# Patient Record
Sex: FEMALE | Race: WHITE | NOT HISPANIC OR LATINO | Employment: STUDENT | URBAN - METROPOLITAN AREA
[De-identification: names, ages, dates, MRNs, and addresses within clinical notes are randomized per-mention and may not be internally consistent; named-entity substitution may affect disease eponyms.]

---
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
## 2025-01-07 ENCOUNTER — OFFICE VISIT (OUTPATIENT)
Dept: DERMATOLOGY | Facility: CLINIC | Age: 20
End: 2025-01-07
Payer: COMMERCIAL

## 2025-01-07 DIAGNOSIS — L20.9 ATOPIC DERMATITIS, UNSPECIFIED TYPE: Primary | ICD-10-CM

## 2025-01-07 DIAGNOSIS — L85.8 KERATOSIS PILARIS: ICD-10-CM

## 2025-01-07 PROCEDURE — 1160F RVW MEDS BY RX/DR IN RCRD: CPT | Mod: CPTII,S$GLB,, | Performed by: STUDENT IN AN ORGANIZED HEALTH CARE EDUCATION/TRAINING PROGRAM

## 2025-01-07 PROCEDURE — 1159F MED LIST DOCD IN RCRD: CPT | Mod: CPTII,S$GLB,, | Performed by: STUDENT IN AN ORGANIZED HEALTH CARE EDUCATION/TRAINING PROGRAM

## 2025-01-07 PROCEDURE — 99204 OFFICE O/P NEW MOD 45 MIN: CPT | Mod: S$GLB,,, | Performed by: STUDENT IN AN ORGANIZED HEALTH CARE EDUCATION/TRAINING PROGRAM

## 2025-01-07 PROCEDURE — 99999 PR PBB SHADOW E&M-EST. PATIENT-LVL III: CPT | Mod: PBBFAC,,, | Performed by: STUDENT IN AN ORGANIZED HEALTH CARE EDUCATION/TRAINING PROGRAM

## 2025-01-07 RX ORDER — FLUOCINONIDE 0.5 MG/G
CREAM TOPICAL 2 TIMES DAILY
Qty: 60 G | Refills: 2 | Status: SHIPPED | OUTPATIENT
Start: 2025-01-07

## 2025-01-07 NOTE — PROGRESS NOTES
Subjective:      Patient ID:  Lina Xiong is a 19 y.o. female who presents for   Chief Complaint   Patient presents with    Rash     Pt is here for rash on body   Pt states the rash has been there since 2023   Pt states it comes with itching and dry patches   Pt states mainly on arms and legs       Used TAC which did not help    Review of Systems   Skin:  Positive for itching, rash and dry skin.       Objective:   Physical Exam   Constitutional: She appears well-developed and well-nourished. No distress.   Neurological: She is alert and oriented to person, place, and time. She is not disoriented.   Psychiatric: She has a normal mood and affect.   Skin:   Areas Examined (abnormalities noted in diagram):   RUE Inspected  LUE Inspection Performed  RLE Inspected  LLE Inspection Performed            Diagram Legend     Erythematous scaling macule/papule c/w actinic keratosis       Vascular papule c/w angioma      Pigmented verrucoid papule/plaque c/w seborrheic keratosis      Yellow umbilicated papule c/w sebaceous hyperplasia      Irregularly shaped tan macule c/w lentigo     1-2 mm smooth white papules consistent with Milia      Movable subcutaneous cyst with punctum c/w epidermal inclusion cyst      Subcutaneous movable cyst c/w pilar cyst      Firm pink to brown papule c/w dermatofibroma      Pedunculated fleshy papule(s) c/w skin tag(s)      Evenly pigmented macule c/w junctional nevus     Mildly variegated pigmented, slightly irregular-bordered macule c/w mildly atypical nevus      Flesh colored to evenly pigmented papule c/w intradermal nevus       Pink pearly papule/plaque c/w basal cell carcinoma      Erythematous hyperkeratotic cursted plaque c/w SCC      Surgical scar with no sign of skin cancer recurrence      Open and closed comedones      Inflammatory papules and pustules      Verrucoid papule consistent consistent with wart     Erythematous eczematous patches and plaques     Dystrophic  onycholytic nail with subungual debris c/w onychomycosis     Umbilicated papule    Erythematous-base heme-crusted tan verrucoid plaque consistent with inflamed seborrheic keratosis     Erythematous Silvery Scaling Plaque c/w Psoriasis     See annotation      Assessment / Plan:        Atopic dermatitis, unspecified type  -     fluocinonide 0.05% (LIDEX) 0.05 % cream; Apply topically 2 (two) times daily. Use to affected areas for up to 2 weeks then take a 1 week break or decrease to 3 times weekly. Do not apply to groin or face. Steroid. Use to red itchy patches when flaring  Dispense: 60 g; Refill: 2  - counseled on dry skin care. Cerave cream bid    Keratosis pilaris  - reassured  - amlactin bid           Follow up if symptoms worsen or fail to improve.

## 2025-01-07 NOTE — PATIENT INSTRUCTIONS
XEROSIS (DRY SKIN)        Definition    Xerosis is the term for dry skin.  We all have a natural oil coating over our skin produced by the skin oil glands.  If this oil is removed, the skin becomes dry which can lead to cracking, which can lead to inflammation.  Xerosis is usually a long-term problem that recurs often, especially in the winter.    Cause    Long hot baths or showers can remove our natural oil and lead to xerosis.  One should never take more than one bath or shower a day and for no longer than ten minutes.  Use of harsh soaps such as Zest, Dial, and Ivory can worsen and cause xerosis.  Cold winter weather worsens xerosis because the amount of moisture contained in cold air is much less than the amount of moisture in warm air.    Treatment    Treatment is intended to restore the natural oil to your skin.  Keep the skin lubricated.    Do not take more than one bath or shower a day.  Use lukewarm water, not hot.  Hot water dries out the skin.    Use a gentle moisturizing soap such as Cetaphil soap, Oil of Olay, Dove, Basis, Ivory moisture care, Restoraderm cleanser.    When toweling dry, dont rub.  Blot the skin so there is still some water left on the skin.  You should apply a moisturizing cream to all of the skin such as Cerave cream, Cetaphil cream, Lipikar [place] AP+ Intense Repair Moisturizing Cream or Restoraderm or Eucerin Original Formula cream.   Alpha hydroxyacid lotions, i.e., AmLactin, also work very well for preventing dry skin, but may burn when used on inflamed or reddened skin.    If you like to swim during the winter months, you should not use soap when getting out of the pool.  When you have finished swimming, rinse off the chlorine with cool to warm water.  If this will be the only shower of the day, then you may use Cetaphil or another mild soap to cleanse your skin.  After the shower, apply a moisturizing cream to all of the skin as above.        [address] [place] [address]/ [phone] FAX/ [email or web address]

## 2025-01-13 ENCOUNTER — HOSPITAL ENCOUNTER (OUTPATIENT)
Dept: RADIOLOGY | Facility: HOSPITAL | Age: 20
Discharge: HOME | End: 2025-01-13
Attending: INTERNAL MEDICINE
Payer: COMMERCIAL

## 2025-01-13 DIAGNOSIS — G43.E09 CHRONIC MIGRAINE WITH AURA WITHOUT STATUS MIGRAINOSUS, NOT INTRACTABLE: ICD-10-CM

## 2025-01-13 PROCEDURE — 70450 CT HEAD/BRAIN W/O DYE: CPT | Mod: TC

## 2025-01-13 PROCEDURE — 70450 CT HEAD/BRAIN W/O DYE: CPT | Mod: 26,,, | Performed by: RADIOLOGY

## 2025-01-14 ENCOUNTER — OFFICE VISIT (OUTPATIENT)
Dept: NEUROLOGY | Facility: CLINIC | Age: 20
End: 2025-01-14
Payer: COMMERCIAL

## 2025-01-14 VITALS
HEIGHT: 66 IN | HEART RATE: 102 BPM | BODY MASS INDEX: 42.14 KG/M2 | WEIGHT: 262.25 LBS | DIASTOLIC BLOOD PRESSURE: 123 MMHG | SYSTOLIC BLOOD PRESSURE: 160 MMHG

## 2025-01-14 DIAGNOSIS — G43.E09 CHRONIC MIGRAINE WITH AURA WITHOUT STATUS MIGRAINOSUS, NOT INTRACTABLE: ICD-10-CM

## 2025-01-14 DIAGNOSIS — R51.9 NONINTRACTABLE HEADACHE, UNSPECIFIED CHRONICITY PATTERN, UNSPECIFIED HEADACHE TYPE: Primary | ICD-10-CM

## 2025-01-14 PROCEDURE — 99203 OFFICE O/P NEW LOW 30 MIN: CPT | Mod: S$GLB,,, | Performed by: STUDENT IN AN ORGANIZED HEALTH CARE EDUCATION/TRAINING PROGRAM

## 2025-01-14 PROCEDURE — 3008F BODY MASS INDEX DOCD: CPT | Mod: CPTII,S$GLB,, | Performed by: STUDENT IN AN ORGANIZED HEALTH CARE EDUCATION/TRAINING PROGRAM

## 2025-01-14 PROCEDURE — 1159F MED LIST DOCD IN RCRD: CPT | Mod: CPTII,S$GLB,, | Performed by: STUDENT IN AN ORGANIZED HEALTH CARE EDUCATION/TRAINING PROGRAM

## 2025-01-14 PROCEDURE — 3080F DIAST BP >= 90 MM HG: CPT | Mod: CPTII,S$GLB,, | Performed by: STUDENT IN AN ORGANIZED HEALTH CARE EDUCATION/TRAINING PROGRAM

## 2025-01-14 PROCEDURE — 1160F RVW MEDS BY RX/DR IN RCRD: CPT | Mod: CPTII,S$GLB,, | Performed by: STUDENT IN AN ORGANIZED HEALTH CARE EDUCATION/TRAINING PROGRAM

## 2025-01-14 PROCEDURE — 99999 PR PBB SHADOW E&M-EST. PATIENT-LVL V: CPT | Mod: PBBFAC,,, | Performed by: STUDENT IN AN ORGANIZED HEALTH CARE EDUCATION/TRAINING PROGRAM

## 2025-01-14 PROCEDURE — 3077F SYST BP >= 140 MM HG: CPT | Mod: CPTII,S$GLB,, | Performed by: STUDENT IN AN ORGANIZED HEALTH CARE EDUCATION/TRAINING PROGRAM

## 2025-01-14 NOTE — PROGRESS NOTES
GENERAL NEUROLOGY VISIT   Date: 1/14/25  Patient Name: Lina Xiong   MRN: [id number]   PCP: Eliana Price  Referring Provider: Eliana Price MD    History:    Patient is a 19 y.o.  female who was referred for headache evaluation.  PMHx of ADHD, Hypertriglyceridemia, elevated BMI, POS, Hyperinsulinemia, irregular period.     Patient with hx of tension headache when grew up, reported mild and does not need medication for it, in the past few months reported new headache as detailed below.    Headache details:  #Description: R temporal, throbbing pain, usually when wake up in the morning,make her dizzy last portion of the day, other type tension headache,mild.   # of Total headache days per month:  3/ week.   # of Migraine days per month: 4/ months  Intensity of average and most severe headaches: 8/10 , mild 2-3/10  Duration of headache pain: whole to portion of the day  Laterality: Right   Location: R temporal   Aura: no   Photophobia and phonophobia: avoid light   Nausea and vomiting: no   Causes avoidance of physical activity:   Worsened by physical activity: no  Preventive Medications failed: none   Acute medications failed: none   OTC Medications: aleve , she take it 1-2 /week   Is medication overuse contributing to the patient's current migraine burden: no but patient is at risk     Sleep : she takes melatonin, reported snore,on frequent waking up whoever move a lot in bed, denies feeling tired in the morning, no day naps.     Denies blurred vision, headache severity changes with position.     Medical history: as above  Nephrolithiasis/Glaucoma - no  Stroke/heart disease - no  Hypertension - no hx but today 160/123  Anxiety/Depression - no  Latest Imaging: CTH , no acute findings     Current Regimen:   Aleve, stated take it 3 to 4 times a month  Past Medical History:   Diagnosis Date    ADHD (attention deficit hyperactivity disorder)        Past Surgical History:   Procedure Laterality Date     TYMPANOSTOMY TUBE PLACEMENT         Social History     Socioeconomic History    Marital status: Single   Tobacco Use    Smoking status: Never    Smokeless tobacco: Never   Substance and Sexual Activity    Alcohol use: Never    Drug use: Never    Sexual activity: Never   Social History Narrative    ** Merged History Encounter **         Adopted  Lives at home with both parents  Attends [employer] 12th grade  1 dog  On the track team and dance.     Social Drivers of Health     Financial Resource Strain: Low Risk  (1/16/2024)    Overall Financial Resource Strain (CARDIA)     Difficulty of Paying Living Expenses: Not hard at all   Food Insecurity: No Food Insecurity (1/16/2024)    Hunger Vital Sign     Worried About Running Out of Food in the Last Year: Never true     Ran Out of Food in the Last Year: Never true   Transportation Needs: No Transportation Needs (1/16/2024)    PRAPARE - Transportation     Lack of Transportation (Medical): No     Lack of Transportation (Non-Medical): No   Physical Activity: Insufficiently Active (1/16/2024)    Exercise Vital Sign     Days of Exercise per Week: 1 day     Minutes of Exercise per Session: 20 min   Stress: Stress Concern Present (1/16/2024)    [employer] - Occupational Stress Questionnaire     Feeling of Stress : Very much   Housing Stability: Unknown (1/16/2024)    Housing Stability Vital Sign     Unable to Pay for Housing in the Last Year: No     Unstable Housing in the Last Year: No       Review of patient's allergies indicates:  No Known Allergies    Current Outpatient Medications on File Prior to Visit   Medication Sig Dispense Refill    azelastine (ASTELIN) 137 mcg (0.1 %) nasal spray 1 spray (137 mcg total) by Nasal route 2 (two) times daily. 30 mL 11    dexmethylphenidate (FOCALIN XR) 30 mg 24 hr capsule Take 1 capsule by mouth every morning. 90 capsule 0    drospirenone-ethinyl estradioL (MIRTHA) 3-0.02 mg per tablet Take 1 tablet by  "mouth once daily. 84 tablet 3    fluocinonide 0.05% (LIDEX) 0.05 % cream Apply topically 2 (two) times daily. Use to affected areas for up to 2 weeks then take a 1 week break or decrease to 3 times weekly. Do not apply to groin or face. Steroid. Use to red itchy patches when flaring 60 g 2    levocetirizine (XYZAL) 5 MG tablet Take 5 mg by mouth.      metFORMIN (GLUCOPHAGE) 500 MG tablet Take 1 tablet (500 mg total) by mouth daily with breakfast. 90 tablet 0    montelukast (SINGULAIR) 10 mg tablet Take 10 mg by mouth.      omeprazole (PRILOSEC) 20 MG capsule Take 20 mg by mouth.      spironolactone (ALDACTONE) 100 MG tablet TAKE 1 TABLET(100 MG) BY MOUTH EVERY DAY 90 tablet 2    topiramate (TOPAMAX) 25 MG tablet Take 1 tablet (25 mg total) by mouth every evening. 30 tablet 11    clotrimazole (LOTRIMIN) 1 % cream Apply topically 2 (two) times daily. for 14 days 45 g 0    eflornithine (VANIQA) 13.9 % cream Apply topically 2 (two) times daily. 45 g 3    mupirocin (BACTROBAN) 2 % ointment Apply topically 2 (two) times daily.       No current facility-administered medications on file prior to visit.        Family history:  Family History   Adopted: Yes       Review Of Systems     Constitutional Negative for fevers, chills, weigh loss   HEENT Negative for hearing loss, dysphagia, sore throat, diplopia   Respiratory Negative for shortness of breath, cough    Cardiovascular Negative for chest pain, palpitations    Gastrointestinal Negative for constipation, diarrhea, early satiety    Skin Negative for rashes    Musculoskeletal Negative for joint pains, myalgias.   Neurological See Above    Psychological Negative for sleep disturbances.    Heme/Lymph Negative for easy bruising, easy bleeding    Endocrine Negative for polyuria, polydypsia     Physical Exam:     Physical Examination    Vitals: BP (!) 160/123 Comment: 136/82  Pulse 102   Ht 5' 6" (1.676 m)   Wt 118.9 kg (262 lb 3.8 oz)   BMI 42.33 kg/m² "       NEURO    Neurological Exam  Mental Status:  Alert and oriented to person, place and time, recent and remote memory intact, normal attention span and fund of knowledge; Speech is spontaneous and fluent     Cranial Nerve exam:  II: Visual fields full to confrontation; Pupils equal round and reactive about 3mm  III, IV, VI: Extraoccular movements intact with no nystagmus  V: Sensation in V1, V2, V3 intact to light-touch bilaterally,  VII:  No facial weakness,  VIII: Hearing grossly intact  IX,X: palate elevation symmetric   XI: SCM & Trapezius normal,  XII: tongue midline, normal morphology, tongue movement normal     Motor Exam: No involuntary movement. Normal tone and bulk in all 4 extremities  Strength: 5/5 throughout   Reflexes: 2+ throughout    Sensory Exam: Intact touch, pain and vibration in all 4 limbs    Cerebellar Sign: Normal Finger-to-nose,   bilaterally.  Gait:  Normal steady physiologic gait with normal arm swinging on both side     Interval/Previous Work-up:   Reviewed      Assessment and Plan:   Lina Xiong is a 19 y.o. female presenting headache evaluation    -New migrainous in nature headache, not intractable superimposed by possible sleep apnea, overweight, hormonal disturbance.   -Tension headache      Problem List Items Addressed This Visit    None  Visit Diagnoses       Nonintractable headache, unspecified chronicity pattern, unspecified headache type    -  Primary    Relevant Orders    Ambulatory referral/consult to Sleep Disorders    Chronic migraine with aura without status migrainosus, not intractable              -- Preventive: No need headache less than 15 per month  -- Abortive:  Continue Aleve p.r.n.  --ambulatory referral to sleep Clinic for sleep apnea evaluation  --blood pressure was elevated, by chart checking blood pressure was normal before, recommend follow-up for blood pressure check with PCP  -- Consider the following supplements: Mg Oxide 400mg daily or  Riboflavin (Vit B2) 400mg daily or CoQ 200mg daily  -- Headache diary  -- Counseled on the followin) Medication overuse headache  2) Trigger avoidance  3) Sleep hygiene  4) Exercise. 30 mins of cardio atleast 3-4 days per week.     Mediterranean Diet recommendations (Adopted from Zuri et al, [facility], 2018.)  -- Abundant use of Olive Oil for cooking and dressing dishes. Use herbs and spices instead to salt flavored foods.  -- Consumption of >=2 daily servings of vegetables (at least 1 of them as fresh vegetables in a salad), discounting side dishes.  -- >=2-3 daily servings of fresh fruits, >=3 weekly servings of legumes  -- >=3 weekly servings of fish or seafood (at least 1 serving of fatty fish).  Limit red meat and processed meats to no more than few times a month.  -- >=1 weekly serving of nuts or seeds  -- Limit consumption of cream, butter, margarine, processed foods, refined carbohydrates, sugary sodas and sweets               RTC 2-3 month virtual     Time spent on this encounter: 40 minutes. This includes face to face time and non-face to face time preparing to see the patient (eg, review of tests), obtaining and/or reviewing separately obtained history, documenting clinical information in the electronic or other health record, independently interpreting results and communicating results to the patient/family/caregiver, or care coordinator.       A dictation device was used to produce this document. Use of such devices sometimes results in grammatical errors or replacement of words that sound similarly.     Dolly Oh MD, M.B.Ch.B  Neurology, Vascular neurology  [facility]

## 2025-02-03 DIAGNOSIS — E28.2 PCOS (POLYCYSTIC OVARIAN SYNDROME): ICD-10-CM

## 2025-02-03 RX ORDER — METFORMIN HYDROCHLORIDE 500 MG/1
500 TABLET ORAL
Qty: 90 TABLET | Refills: 1 | Status: SHIPPED | OUTPATIENT
Start: 2025-02-03

## 2025-02-03 NOTE — TELEPHONE ENCOUNTER
Refill Decision Note   Lina Inga  is requesting a refill authorization.  Brief Assessment and Rationale for Refill:  Approve     Medication Therapy Plan:         Comments:     Note composed:11:10 AM 02/03/2025

## 2025-02-07 ENCOUNTER — PATIENT MESSAGE (OUTPATIENT)
Dept: INTERNAL MEDICINE | Facility: CLINIC | Age: 20
End: 2025-02-07
Payer: COMMERCIAL

## 2025-02-07 DIAGNOSIS — F90.9 ATTENTION DEFICIT HYPERACTIVITY DISORDER (ADHD), UNSPECIFIED ADHD TYPE: ICD-10-CM

## 2025-02-07 DIAGNOSIS — Z79.899 ENCOUNTER FOR LONG-TERM CURRENT USE OF MEDICATION: ICD-10-CM

## 2025-02-07 RX ORDER — DEXMETHYLPHENIDATE HYDROCHLORIDE 30 MG/1
1 CAPSULE, EXTENDED RELEASE ORAL EVERY MORNING
Qty: 90 CAPSULE | Refills: 0 | Status: CANCELLED | OUTPATIENT
Start: 2025-02-07

## 2025-02-07 NOTE — TELEPHONE ENCOUNTER
Pt is requesting a refill for Focalin 30 mg originally prescribed by pediatrician.    Med pended  Lov11/26/24  Nov 3/11/25

## 2025-02-10 ENCOUNTER — OFFICE VISIT (OUTPATIENT)
Dept: NEUROLOGY | Facility: CLINIC | Age: 20
End: 2025-02-10
Payer: COMMERCIAL

## 2025-02-10 DIAGNOSIS — G43.E09 CHRONIC MIGRAINE WITH AURA WITHOUT STATUS MIGRAINOSUS, NOT INTRACTABLE: ICD-10-CM

## 2025-02-10 DIAGNOSIS — R51.9 NONINTRACTABLE HEADACHE, UNSPECIFIED CHRONICITY PATTERN, UNSPECIFIED HEADACHE TYPE: Primary | ICD-10-CM

## 2025-02-10 PROCEDURE — 98006 SYNCH AUDIO-VIDEO EST MOD 30: CPT | Mod: 95,,, | Performed by: STUDENT IN AN ORGANIZED HEALTH CARE EDUCATION/TRAINING PROGRAM

## 2025-02-10 RX ORDER — SUMATRIPTAN SUCCINATE 25 MG/1
50 TABLET ORAL
Qty: 9 TABLET | Refills: 2 | Status: SHIPPED | OUTPATIENT
Start: 2025-02-10 | End: 2025-03-12

## 2025-02-10 RX ORDER — TOPIRAMATE 25 MG/1
50 TABLET ORAL NIGHTLY
Qty: 60 TABLET | Refills: 11 | Status: SHIPPED | OUTPATIENT
Start: 2025-02-10 | End: 2026-02-10

## 2025-02-10 RX ORDER — TOPIRAMATE 25 MG/1
25 TABLET ORAL NIGHTLY
Qty: 30 TABLET | Refills: 11 | Status: SHIPPED | OUTPATIENT
Start: 2025-02-10 | End: 2025-02-10

## 2025-02-10 NOTE — TELEPHONE ENCOUNTER
Please see the attached refill request.   Detail Level: Detailed Depth Of Biopsy: dermis Was A Bandage Applied: Yes Size Of Lesion In Cm: 0.4 X Size Of Lesion In Cm: 0 Biopsy Type: H and E Biopsy Method: Dermablade Anesthesia Type: 1% lidocaine with epinephrine Anesthesia Volume In Cc (Will Not Render If 0): 0.5 Hemostasis: Aluminum Chloride Wound Care: Vaseline Dressing: bandage Destruction After The Procedure: No Type Of Destruction Used: Curettage Curettage Text: The wound bed was treated with curettage after the biopsy was performed. Cryotherapy Text: The wound bed was treated with cryotherapy after the biopsy was performed. Electrodesiccation Text: The wound bed was treated with electrodesiccation after the biopsy was performed. Electrodesiccation And Curettage Text: The wound bed was treated with electrodesiccation and curettage after the biopsy was performed. Silver Nitrate Text: The wound bed was treated with silver nitrate after the biopsy was performed. Lab: [facility] Consent: Written consent was obtained and risks were reviewed including but not limited to scarring, infection, bleeding, scabbing, incomplete removal, nerve damage and allergy to anesthesia. Post-Care Instructions: Both verbal and written instructions reviewed with the patient, patient received written post-op care instructions and patient verbalizes understanding. Notification Instructions: Patient will be notified of biopsy results. However, patient instructed to call the office if not contacted within 2 weeks. Billing Type: [employer] Information: Selecting Yes will display possible errors in your note based on the variables you have selected. This validation is only offered as a suggestion for you. PLEASE NOTE THAT THE VALIDATION TEXT WILL BE REMOVED WHEN YOU FINALIZE YOUR NOTE. IF YOU WANT TO FAX A PRELIMINARY NOTE YOU WILL NEED TO TOGGLE THIS TO 'NO' IF YOU DO NOT WANT IT IN YOUR FAXED NOTE. Size Of Lesion In Cm: 0.3 Lab: [id number] Billing Type: Third-Party Bill

## 2025-02-10 NOTE — PROGRESS NOTES
Neurology Clinic Note    The patient location is: Home  The chief complaint leading to consultation is: Headaches     Visit type: audiovisual    Face to Face time with patient: 20  35 minutes of total time spent on the encounter, which includes face to face time and non-face to face time preparing to see the patient (eg, review of tests), Obtaining and/or reviewing separately obtained history, Documenting clinical information in the electronic or other health record, Independently interpreting results (not separately reported) and communicating results to the patient/family/caregiver, or Care coordination (not separately reported).     Each patient to whom he or she provides medical services by telemedicine is:  (1) informed of the relationship between the physician and patient and the respective role of any other health care provider with respect to management of the patient; and (2) notified that he or she may decline to receive medical services by telemedicine and may withdraw from such care at any time.      Date: 2/10/25  Patient Name: Lina Xiong   MRN: [id number]   PCP: Eliana Price  Referring Provider: Eliana Price MD    Assessment and Plan:   Lina Xiong is a 19 y.o. female presenting headache evaluation     -New migrainous in nature headache, not intractable superimposed by possible sleep apnea, overweight, hormonal disturbance.   -Tension headache    Problem List Items Addressed This Visit    None  Visit Diagnoses       Chronic migraine with aura without status migrainosus, not intractable    -  Primary    Nonintractable headache, unspecified chronicity pattern, unspecified headache type              -- Preventive:  Increase Topamax to 50 mg nightly  -- Abortive: Sumatriptan 25 mg p.r.n., can repeat every 2 hours, do not exceed more than 200 mg per day. Continue Aleve p.r.n. if needed as rescue.  --ambulatory referral to sleep Clinic for sleep apnea  evaluation  --blood pressure was elevated, by chart checking blood pressure was normal before, recommend follow-up for blood pressure check with PCP  -- Consider the following supplements: Mg Oxide 400mg daily or Riboflavin (Vit B2) 400mg daily or CoQ 200mg daily  -- Headache diary  -- Counseled on the followin) Medication overuse headache  2) Trigger avoidance  3) Sleep hygiene  4) Exercise. 30 mins of cardio atleast 3-4 days per week.      Mediterranean Diet recommendations (Adopted from Zuri et al, [facility], 2018.)  -- Abundant use of Olive Oil for cooking and dressing dishes. Use herbs and spices instead to salt flavored foods.  -- Consumption of >=2 daily servings of vegetables (at least 1 of them as fresh vegetables in a salad), discounting side dishes.  -- >=2-3 daily servings of fresh fruits, >=3 weekly servings of legumes  -- >=3 weekly servings of fish or seafood (at least 1 serving of fatty fish).  Limit red meat and processed meats to no more than few times a month.  -- >=1 weekly serving of nuts or seeds  -- Limit consumption of cream, butter, margarine, processed foods, refined carbohydrates, sugary sodas and sweets    Subjective:            Interval History (2/10/25):  Patient plan to see me after sleep study but this appointment scheduled sooner, sleep study are not done yet. She will do it when back home from school.   Reported headache is became more frequent, mostly when she wakes up in the morning, 4 times per week, sometimes last the whole day, currently on Topamax 25 mg daily which reported did not help much, relief sometimes helps but she did not want to take any more frequent.        HPI:   Ms. Lina Xiong is a 19 y.o. female who was referred for headache evaluation.  PMHx of ADHD, Hypertriglyceridemia, elevated BMI, POS, Hyperinsulinemia, irregular period.      Patient with hx of tension headache when grew up, reported mild and does not need medication for it, in the past  few months reported new headache as detailed below.     Headache details:  #Description: R temporal, throbbing pain, usually when wake up in the morning,make her dizzy last portion of the day, other type tension headache,mild.   # of Total headache days per month:  3/ week.   # of Migraine days per month: 4/ months  Intensity of average and most severe headaches: 8/10 , mild 2-3/10  Duration of headache pain: whole to portion of the day  Laterality: Right   Location: R temporal   Aura: no   Photophobia and phonophobia: avoid light   Nausea and vomiting: no   Causes avoidance of physical activity:   Worsened by physical activity: no  Preventive Medications failed: none   Acute medications failed: none   OTC Medications: aleve , she take it 1-2 /week   Is medication overuse contributing to the patient's current migraine burden: no but patient is at risk      Sleep : she takes melatonin, reported snore,on frequent waking up whoever move a lot in bed, denies feeling tired in the morning, no day naps.     Denies blurred vision, headache severity changes with position.      Medical history: as above  Nephrolithiasis/Glaucoma - no  Stroke/heart disease - no  Hypertension - no hx but today 160/123  Anxiety/Depression - no  Latest Imaging: CTH , no acute findings     Current Regimen:   Aleve, stated take it 3 to 4 times a month  PAST MEDICAL HISTORY:  Past Medical History:   Diagnosis Date    ADHD (attention deficit hyperactivity disorder)        PAST SURGICAL HISTORY:  Past Surgical History:   Procedure Laterality Date    TYMPANOSTOMY TUBE PLACEMENT         CURRENT MEDS:  Current Outpatient Medications   Medication Sig Dispense Refill    azelastine (ASTELIN) 137 mcg (0.1 %) nasal spray 1 spray (137 mcg total) by Nasal route 2 (two) times daily. 30 mL 11    clotrimazole (LOTRIMIN) 1 % cream Apply topically 2 (two) times daily. for 14 days 45 g 0    dexmethylphenidate (FOCALIN XR) 30 mg 24 hr capsule Take 1 capsule by mouth  every morning. 90 capsule 0    drospirenone-ethinyl estradioL (MIRTHA) 3-0.02 mg per tablet Take 1 tablet by mouth once daily. 84 tablet 3    eflornithine (VANIQA) 13.9 % cream Apply topically 2 (two) times daily. 45 g 3    fluocinonide 0.05% (LIDEX) 0.05 % cream Apply topically 2 (two) times daily. Use to affected areas for up to 2 weeks then take a 1 week break or decrease to 3 times weekly. Do not apply to groin or face. Steroid. Use to red itchy patches when flaring 60 g 2    levocetirizine (XYZAL) 5 MG tablet Take 5 mg by mouth.      metFORMIN (GLUCOPHAGE) 500 MG tablet TAKE 1 TABLET(500 MG) BY MOUTH DAILY WITH BREAKFAST 90 tablet 1    montelukast (SINGULAIR) 10 mg tablet Take 10 mg by mouth.      mupirocin (BACTROBAN) 2 % ointment Apply topically 2 (two) times daily.      omeprazole (PRILOSEC) 20 MG capsule Take 20 mg by mouth.      spironolactone (ALDACTONE) 100 MG tablet TAKE 1 TABLET(100 MG) BY MOUTH EVERY DAY 90 tablet 2    topiramate (TOPAMAX) 25 MG tablet Take 1 tablet (25 mg total) by mouth every evening. 30 tablet 11     No current facility-administered medications for this visit.       ALLERGIES:  Review of patient's allergies indicates:  No Known Allergies    FAMILY HISTORY:  Family History   Adopted: Yes       SOCIAL HISTORY:  Social History     Tobacco Use    Smoking status: Never    Smokeless tobacco: Never   Substance Use Topics    Alcohol use: Never    Drug use: Never       Review of Systems:  12 system review of systems is negative except for the symptoms mentioned in HPI.      Objective:   There were no vitals filed for this visit.    Exam limited -  televideo visit    General: Well-developed, well-groomed. No apparent distress  Eyes: Anicteric, noninjected.  ENT: Normocephalic, atraumatic.    Respiratory: No respiratory distress.    Cardiovascular: Deferred - televideo visit  Abdomen: Deferred - televideo visit  Skin: No rashes, or lesions, nodules on exposed areas    Neurologic Exam  The  patient is awake, alert and oriented. Language is fluent.  Fund of knowledge and attention are appropriate, able to provide detailed medical history.    Cranial nerves:   Pupils are round.  Ocular motility is full in all cardinal positions of gaze.   Facial activation is symmetric.   Shoulder elevation is symmetric.  Tongue protrudes midline.  Exam limited 2/2 televideo visit.    Motor examination   Normal bulk in BUE  No pronator drift.  Exam limited 2/2 televideo visit.    Sensory examination   Deferred - televideo visit    Deep tendon reflexes  Deferred - televideo visit    Gait:   Deferred - televideo visit    Coordination: Finger to nose is normal bilaterally.  Rapid finger movements intact b/l.               Images:    Other Studies:          Dolly Oh MD, M.B.Ch.B  Neurology, Vascular neurology  [facility]

## 2025-02-10 NOTE — TELEPHONE ENCOUNTER
Refill Routing Note   Medication(s) are not appropriate for processing by [facility] for the following reason(s):        Outside of protocol  New or recently adjusted medication  New pharmacy requested than prev order    [facility] action(s):  Route        Medication Therapy Plan: New pharmacy requested from previous      Appointments  past 12m or future 3m with PCP    Date Provider   Last Visit   11/26/2024 Eliana Price MD   Next Visit   3/11/2025 Eliana Price MD   ED visits in past 90 days: 0        Note composed:8:04 AM 02/10/2025

## 2025-02-16 DIAGNOSIS — E28.2 PCOS (POLYCYSTIC OVARIAN SYNDROME): ICD-10-CM

## 2025-02-17 RX ORDER — SPIRONOLACTONE 100 MG/1
100 TABLET, FILM COATED ORAL DAILY
Qty: 90 TABLET | Refills: 1 | Status: SHIPPED | OUTPATIENT
Start: 2025-02-17

## 2025-02-17 NOTE — TELEPHONE ENCOUNTER
Refill Routing Note   Medication(s) are not appropriate for processing by [facility] for the following reason(s):        Required vitals abnormal  01/14/25 (!) 160/123       [facility] action(s):  Defer        Medication Therapy Plan: 01/14/25 (!) 160/123      Appointments  past 12m or future 3m with PCP    Date Provider   Last Visit   8/12/2024 Eliana Anderson MD   Next Visit   Visit date not found Eliana Anderson MD   ED visits in past 90 days: 0        Note composed:8:54 AM 02/17/2025

## 2025-02-28 DIAGNOSIS — R51.9 NONINTRACTABLE HEADACHE, UNSPECIFIED CHRONICITY PATTERN, UNSPECIFIED HEADACHE TYPE: ICD-10-CM

## 2025-03-03 RX ORDER — SUMATRIPTAN SUCCINATE 25 MG/1
50 TABLET ORAL
Qty: 9 TABLET | Refills: 2 | Status: SHIPPED | OUTPATIENT
Start: 2025-03-03 | End: 2025-04-02

## 2025-03-05 DIAGNOSIS — R51.9 NONINTRACTABLE HEADACHE, UNSPECIFIED CHRONICITY PATTERN, UNSPECIFIED HEADACHE TYPE: ICD-10-CM

## 2025-03-06 ENCOUNTER — RESULTS FOLLOW-UP (OUTPATIENT)
Dept: INTERNAL MEDICINE | Facility: CLINIC | Age: 20
End: 2025-03-06
Payer: COMMERCIAL

## 2025-03-06 DIAGNOSIS — R79.89 LFT ELEVATION: Primary | ICD-10-CM

## 2025-03-06 RX ORDER — TOPIRAMATE 25 MG/1
50 TABLET ORAL NIGHTLY
Qty: 60 TABLET | Refills: 11 | Status: SHIPPED | OUTPATIENT
Start: 2025-03-06 | End: 2026-03-06

## 2025-03-06 NOTE — TELEPHONE ENCOUNTER
----- Message from Eliana Price MD sent at 3/6/2025  2:21 PM CST -----  Please arrange for her to have these labs drawn before her upcoming appt on 3/11. Thanks   ----- Message -----  From: [facility] [employer] [facility] Interface  Sent: 12/17/2024   1:45 PM CST  To: Eliana Price MD

## 2025-03-06 NOTE — TELEPHONE ENCOUNTER
I spoke with the patient and she stated she's away at school and will be home Monday so we scheduled her labs to be drawn on Monday 3/10/25 at 10:20am.   [facility] Progress Note       HD ordered by Dr. Pina. Treatment started at 1208 and ended at 1511.    Net UF removed: 2000mL.     Pt tolerated treatment well with no issues. See flow sheet for details. CVC patent, locked with Heparin 1:1000 units; 1.5 mL to RED port and 1.6 mL to BLUE port post dialysis. No s/s of infection present. Dressing in place, CDI. Report given to SHASHI Viramontes.

## 2025-03-10 ENCOUNTER — LAB VISIT (OUTPATIENT)
Dept: LAB | Facility: HOSPITAL | Age: 20
End: 2025-03-10
Payer: COMMERCIAL

## 2025-03-10 DIAGNOSIS — R79.89 LFT ELEVATION: ICD-10-CM

## 2025-03-10 LAB
ALBUMIN SERPL BCP-MCNC: 3.6 G/DL (ref 3.5–5.2)
ALP SERPL-CCNC: 100 U/L (ref 40–150)
ALT SERPL W/O P-5'-P-CCNC: 92 U/L (ref 10–44)
ANION GAP SERPL CALC-SCNC: 9 MMOL/L (ref 8–16)
AST SERPL-CCNC: 65 U/L (ref 10–40)
BASOPHILS # BLD AUTO: 0.09 K/UL (ref 0–0.2)
BASOPHILS NFR BLD: 0.6 % (ref 0–1.9)
BILIRUB SERPL-MCNC: 0.3 MG/DL (ref 0.1–1)
BUN SERPL-MCNC: 11 MG/DL (ref 6–20)
CALCIUM SERPL-MCNC: 9.9 MG/DL (ref 8.7–10.5)
CHLORIDE SERPL-SCNC: 102 MMOL/L (ref 95–110)
CO2 SERPL-SCNC: 23 MMOL/L (ref 23–29)
CREAT SERPL-MCNC: 0.8 MG/DL (ref 0.5–1.4)
DIFFERENTIAL METHOD BLD: ABNORMAL
EOSINOPHIL # BLD AUTO: 0.5 K/UL (ref 0–0.5)
EOSINOPHIL NFR BLD: 3.1 % (ref 0–8)
ERYTHROCYTE [DISTWIDTH] IN BLOOD BY AUTOMATED COUNT: 13.2 % (ref 11.5–14.5)
EST. GFR  (NO RACE VARIABLE): >60 ML/MIN/1.73 M^2
GLUCOSE SERPL-MCNC: 96 MG/DL (ref 70–110)
HCT VFR BLD AUTO: 48.2 % (ref 37–48.5)
HGB BLD-MCNC: 15.2 G/DL (ref 12–16)
IMM GRANULOCYTES # BLD AUTO: 0.21 K/UL (ref 0–0.04)
IMM GRANULOCYTES NFR BLD AUTO: 1.3 % (ref 0–0.5)
LYMPHOCYTES # BLD AUTO: 4.1 K/UL (ref 1–4.8)
LYMPHOCYTES NFR BLD: 26.5 % (ref 18–48)
MCH RBC QN AUTO: 27.9 PG (ref 27–31)
MCHC RBC AUTO-ENTMCNC: 31.5 G/DL (ref 32–36)
MCV RBC AUTO: 88 FL (ref 82–98)
MONOCYTES # BLD AUTO: 0.8 K/UL (ref 0.3–1)
MONOCYTES NFR BLD: 5.2 % (ref 4–15)
NEUTROPHILS # BLD AUTO: 9.9 K/UL (ref 1.8–7.7)
NEUTROPHILS NFR BLD: 63.3 % (ref 38–73)
NRBC BLD-RTO: 0 /100 WBC
PLATELET # BLD AUTO: 562 K/UL (ref 150–450)
PMV BLD AUTO: 9.4 FL (ref 9.2–12.9)
POTASSIUM SERPL-SCNC: 4.1 MMOL/L (ref 3.5–5.1)
PROT SERPL-MCNC: 8.2 G/DL (ref 6–8.4)
RBC # BLD AUTO: 5.45 M/UL (ref 4–5.4)
SODIUM SERPL-SCNC: 134 MMOL/L (ref 136–145)
WBC # BLD AUTO: 15.61 K/UL (ref 3.9–12.7)

## 2025-03-10 PROCEDURE — 85025 COMPLETE CBC W/AUTO DIFF WBC: CPT | Performed by: INTERNAL MEDICINE

## 2025-03-10 PROCEDURE — 36415 COLL VENOUS BLD VENIPUNCTURE: CPT | Performed by: INTERNAL MEDICINE

## 2025-03-10 PROCEDURE — 80053 COMPREHEN METABOLIC PANEL: CPT | Performed by: INTERNAL MEDICINE

## 2025-03-11 ENCOUNTER — RESULTS FOLLOW-UP (OUTPATIENT)
Dept: INTERNAL MEDICINE | Facility: CLINIC | Age: 20
End: 2025-03-11

## 2025-03-11 ENCOUNTER — PATIENT MESSAGE (OUTPATIENT)
Dept: INTERNAL MEDICINE | Facility: CLINIC | Age: 20
End: 2025-03-11

## 2025-03-11 ENCOUNTER — OFFICE VISIT (OUTPATIENT)
Dept: INTERNAL MEDICINE | Facility: CLINIC | Age: 20
End: 2025-03-11
Payer: COMMERCIAL

## 2025-03-11 VITALS
WEIGHT: 262.38 LBS | HEART RATE: 110 BPM | HEIGHT: 66 IN | OXYGEN SATURATION: 98 % | SYSTOLIC BLOOD PRESSURE: 139 MMHG | BODY MASS INDEX: 42.17 KG/M2 | DIASTOLIC BLOOD PRESSURE: 87 MMHG

## 2025-03-11 DIAGNOSIS — E66.01 CLASS 3 SEVERE OBESITY WITH SERIOUS COMORBIDITY AND BODY MASS INDEX (BMI) OF 40.0 TO 44.9 IN ADULT, UNSPECIFIED OBESITY TYPE: ICD-10-CM

## 2025-03-11 DIAGNOSIS — K76.0 HEPATIC STEATOSIS: ICD-10-CM

## 2025-03-11 DIAGNOSIS — E28.2 POLYCYSTIC OVARIAN SYNDROME: ICD-10-CM

## 2025-03-11 DIAGNOSIS — E66.01 CLASS 3 SEVERE OBESITY WITH SERIOUS COMORBIDITY AND BODY MASS INDEX (BMI) OF 40.0 TO 44.9 IN ADULT, UNSPECIFIED OBESITY TYPE: Primary | ICD-10-CM

## 2025-03-11 DIAGNOSIS — D72.829 LEUKOCYTOSIS, UNSPECIFIED TYPE: ICD-10-CM

## 2025-03-11 DIAGNOSIS — F90.9 ATTENTION DEFICIT HYPERACTIVITY DISORDER (ADHD), UNSPECIFIED ADHD TYPE: ICD-10-CM

## 2025-03-11 DIAGNOSIS — E66.813 CLASS 3 SEVERE OBESITY WITH SERIOUS COMORBIDITY AND BODY MASS INDEX (BMI) OF 40.0 TO 44.9 IN ADULT, UNSPECIFIED OBESITY TYPE: Primary | ICD-10-CM

## 2025-03-11 DIAGNOSIS — D75.839 THROMBOCYTOSIS: ICD-10-CM

## 2025-03-11 DIAGNOSIS — E66.813 CLASS 3 SEVERE OBESITY WITH SERIOUS COMORBIDITY AND BODY MASS INDEX (BMI) OF 40.0 TO 44.9 IN ADULT, UNSPECIFIED OBESITY TYPE: ICD-10-CM

## 2025-03-11 PROCEDURE — 99214 OFFICE O/P EST MOD 30 MIN: CPT | Mod: S$GLB,,, | Performed by: INTERNAL MEDICINE

## 2025-03-11 PROCEDURE — 3075F SYST BP GE 130 - 139MM HG: CPT | Mod: CPTII,S$GLB,, | Performed by: INTERNAL MEDICINE

## 2025-03-11 PROCEDURE — 3008F BODY MASS INDEX DOCD: CPT | Mod: CPTII,S$GLB,, | Performed by: INTERNAL MEDICINE

## 2025-03-11 PROCEDURE — 99999 PR PBB SHADOW E&M-EST. PATIENT-LVL III: CPT | Mod: PBBFAC,,, | Performed by: INTERNAL MEDICINE

## 2025-03-11 PROCEDURE — 3079F DIAST BP 80-89 MM HG: CPT | Mod: CPTII,S$GLB,, | Performed by: INTERNAL MEDICINE

## 2025-03-11 RX ORDER — DEXMETHYLPHENIDATE HYDROCHLORIDE 30 MG/1
1 CAPSULE, EXTENDED RELEASE ORAL EVERY MORNING
Qty: 30 CAPSULE | Refills: 0 | Status: SHIPPED | OUTPATIENT
Start: 2025-03-11

## 2025-03-11 NOTE — PROGRESS NOTES
Subjective:       Patient ID: Lina Xiong is a 19 y.o. female.    Chief Complaint: Follow-up    LFTS remain elevated.   WBC and platelets remain elevated on labs.   Liver US shows hepatomegaly with hepatic steatosis.     Denies any RUQ pain.   Denies any recent fevers or chills. No other symptoms of infectin.     Migraines: now following with neurology. Currently on topamax and sumatriptan. Migraines have decreased in frequency but slightly increased in intensity.     ADD on focalin     PCOS on spironolactone and metformin.      Headache   Associated symptoms include neck pain. Pertinent negatives include no hearing loss, rhinorrhea, vomiting or weakness.     Review of Systems   Constitutional:  Positive for activity change. Negative for unexpected weight change.   HENT:  Negative for hearing loss, rhinorrhea and trouble swallowing.    Eyes:  Positive for visual disturbance. Negative for discharge.   Respiratory:  Negative for chest tightness and wheezing.    Cardiovascular:  Negative for chest pain and palpitations.   Gastrointestinal:  Negative for blood in stool, constipation, diarrhea and vomiting.   Endocrine: Negative for polydipsia and polyuria.   Genitourinary:  Negative for difficulty urinating, dysuria, hematuria and menstrual problem.   Musculoskeletal:  Positive for neck pain. Negative for arthralgias and joint swelling.   Neurological:  Positive for headaches. Negative for weakness.   Psychiatric/Behavioral:  Negative for confusion and dysphoric mood.            Past Medical History:   Diagnosis Date    ADHD (attention deficit hyperactivity disorder)      Past Surgical History:   Procedure Laterality Date    TYMPANOSTOMY TUBE PLACEMENT        Patient Active Problem List   Diagnosis    Molluscum contagiosum    ADHD (attention deficit hyperactivity disorder)    Body mass index, pediatric, greater than or equal to 95th percentile for age    Acanthosis nigricans    Abnormal weight gain     Irregular periods    BMI (body mass index) pediatric, > 99% for age, obese child, tertiary care intervention    Hypertriglyceridemia    Hyperinsulinemia    Hirsutism    Polycystic ovarian syndrome        Objective:      Physical Exam  Constitutional:       Appearance: Normal appearance.   HENT:      Head: Normocephalic.   Cardiovascular:      Rate and Rhythm: Normal rate and regular rhythm.      Pulses: Normal pulses.      Heart sounds: Normal heart sounds.   Pulmonary:      Effort: Pulmonary effort is normal.      Breath sounds: Normal breath sounds.   Abdominal:      General: Abdomen is flat. Bowel sounds are normal.      Palpations: Abdomen is soft.   Musculoskeletal:         General: Normal range of motion.      Cervical back: Normal range of motion and neck supple.   Skin:     General: Skin is warm and dry.   Neurological:      General: No focal deficit present.      Mental Status: She is alert and oriented to person, place, and time.   Psychiatric:         Mood and Affect: Mood normal.         Assessment:       Problem List Items Addressed This Visit          Psychiatric    ADHD (attention deficit hyperactivity disorder)    Relevant Medications    dexmethylphenidate (FOCALIN XR) 30 mg 24 hr capsule       Endocrine    Polycystic ovarian syndrome    Relevant Medications    tirzepatide 2.5 mg/0.5 mL PnIj     Other Visit Diagnoses         Class 3 severe obesity with serious comorbidity and body mass index (BMI) of 40.0 to 44.9 in adult, unspecified obesity type    -  Primary    Relevant Medications    tirzepatide 2.5 mg/0.5 mL PnIj      Hepatic steatosis        Relevant Medications    tirzepatide 2.5 mg/0.5 mL PnIj    Other Relevant Orders    Ambulatory referral/consult to Hepatology      Leukocytosis, unspecified type        Relevant Orders    E-Consult to Hemonc      Thrombocytosis        Relevant Orders    E-Consult to Hemonc            Plan:         Lina was seen today for follow-up.    Diagnoses and all  orders for this visit:    Class 3 severe obesity with serious comorbidity and body mass index (BMI) of 40.0 to 44.9 in adult, unspecified obesity type  Hepatic steatosis  -     tirzepatide 2.5 mg/0.5 mL PnIj; Inject 2.5 mg into the skin every 7 days.  -     Ambulatory referral/consult to Hepatology; Future  Discussed that weight loss would be the only treatment for hepatic steatosis.   She is already on stimulant and topamax.   We will start Mounjaro 2.5 mg weekly  We discussed potential side effects such as nausea, constipation, abdominal pain, diarrhea.  We discussed that we will increase the dose every 4 weeks as she tolerates it   Referral to hepatology for further evaluation.     Polycystic ovarian syndrome  -     tirzepatide 2.5 mg/0.5 mL PnIj; Inject 2.5 mg into the skin every 7 days.    Leukocytosis, unspecified type  Thrombocytosis  -     E-Consult to Hemonc  E consult to hematology for recs for further evaluation. Possible all reactive?     Attention deficit hyperactivity disorder (ADHD), unspecified ADHD type  -     dexmethylphenidate (FOCALIN XR) 30 mg 24 hr capsule; Take 1 capsule by mouth every morning.    Doing well on this dose. Denies any chest pain, palpitations, insomnia or decreased appetite.            Eliana Price MD   Internal Medicine   Primary Care

## 2025-03-13 ENCOUNTER — OFFICE VISIT (OUTPATIENT)
Dept: HEPATOLOGY | Facility: CLINIC | Age: 20
End: 2025-03-13
Payer: COMMERCIAL

## 2025-03-13 ENCOUNTER — E-CONSULT (OUTPATIENT)
Dept: HEMATOLOGY/ONCOLOGY | Facility: CLINIC | Age: 20
End: 2025-03-13
Payer: COMMERCIAL

## 2025-03-13 DIAGNOSIS — E66.01 MORBID OBESITY: ICD-10-CM

## 2025-03-13 DIAGNOSIS — R74.8 ELEVATED LIVER ENZYMES: ICD-10-CM

## 2025-03-13 DIAGNOSIS — R16.0 HEPATOMEGALY: ICD-10-CM

## 2025-03-13 DIAGNOSIS — D72.829 LEUKOCYTOSIS, UNSPECIFIED TYPE: Primary | ICD-10-CM

## 2025-03-13 DIAGNOSIS — E78.1 HYPERTRIGLYCERIDEMIA: ICD-10-CM

## 2025-03-13 DIAGNOSIS — E28.2 POLYCYSTIC OVARIAN SYNDROME: ICD-10-CM

## 2025-03-13 DIAGNOSIS — R73.03 PREDIABETES: ICD-10-CM

## 2025-03-13 DIAGNOSIS — K76.0 HEPATIC STEATOSIS: Primary | ICD-10-CM

## 2025-03-13 NOTE — PROGRESS NOTES
The patient location is: [place], [place].  The chief complaint leading to consultation is: Fatty Liver     Visit type: audiovisual    30 minutes of total time spent on the encounter, which includes face to face time and non-face to face time preparing to see the patient (eg, review of tests), Obtaining and/or reviewing separately obtained history, Documenting clinical information in the electronic or other health record, Independently interpreting results (not separately reported) and communicating results to the patient/family/caregiver, or Care coordination (not separately reported).     Each patient to whom he or she provides medical services by telemedicine is:  (1) informed of the relationship between the physician and patient and the respective role of any other health care provider with respect to management of the patient; and (2) notified that he or she may decline to receive medical services by telemedicine and may withdraw from such care at any time.    Notes:      [facility] New Patient Visit    Reason for Visit:  Fatty Liver    PCP: Eliana Price    HPI:  This is a 19 y.o. female with PMH noted below, here for evaluation of fatty liver.    The patient's risk factors for fatty liver disease include:     Overweight/Obesity                     Yes; BMI 42.34 - Started Tirzepatide today per PCP.  Dyslipidemia                                Yes; Refer to most recent lipid panel results below:   Latest Reference Range & Units 12/17/24 10:22   Cholesterol Total 120 - 199 mg/dL 168   HDL 40 - 75 mg/dL 45   HDL/Cholesterol Ratio 20.0 - 50.0 % 26.8   Non-HDL Cholesterol mg/dL 123   Total Cholesterol/HDL Ratio 2.0 - 5.0  3.7   Triglycerides 30 - 150 mg/dL 291 (H)   LDL Cholesterol 63.0 - 159.0 mg/dL 64.8     Insulin resistance/Diabetes         Yes; Last HgbA1c was 5.7% (11/2024)    She has had intermittently elevated liver enzymes in a hepatocellular pattern since at least 4/2008.    Abdominal US  in 12/2024 showed:    FINDINGS:    Pancreas: Obscured by overlying bowel gas.     Aorta: No aneurysm.     The liver is enlarged in size measuring 23.1 cm.  It demonstrates increased echogenicity throughout and attenuates the sound consistent with fatty liver infiltration.     Gallbladder: No calculi, wall thickening, or pericholecystic fluid.  Negative sonographic Diaz's sign.     Biliary system: 5.8 mm common bile duct.  No intrahepatic ductal dilatation.     Inferior vena cava: Normal in appearance.     Right kidney: 10.3 cm. No hydronephrosis.     Left kidney: 10.7 cm. No hydronephrosis.     Spleen: 11.1 x 12.9 cm.  Normal in size with homogeneous echotexture.     Miscellaneous: No ascites.     Impression:     Hepatomegaly with hepatic steatosis.    She has never undergone a liver biopsy or non-invasive staging exam.    FIB-4 Calculation: 0.23 at 3/10/2025 10:29 AM  Calculated from:  SGOT/AST: 65 U/L at 3/10/2025 10:29 AM  SGPT/ALT: 92 U/L at 3/10/2025 10:29 AM  Platelets: 562 K/uL at 3/10/2025 10:29 AM  Age: 19 years     FIB-4 below 1.30 is considered as low-risk for advanced fibrosis  FIB-4 over 2.67 is considered as high-risk for advanced fibrosis  FIB-4 values between 1.30 and 2.67 are considered as intermediate-risk of advanced fibrosis for ages 36-64.     For ages > 64 the cut-off for low-risk goes to < 2.  This is a screening tool and clinical judgement should be used in the interpretation of these results.    She is adopted, so is unsure of her family medical history. She denies any history of heavy alcohol intake, and does not use illicit drugs. HAV, HBV, HCV negative on prior labs.    She is well appearing, and has no signs or symptoms of hepatic decompensation including jaundice, dark urine, pruritus, abdominal distention, lower extremity edema, hematemesis, melena, or periods of confusion suggestive of hepatic encephalopathy.      PMHX:  has a past medical history of ADHD (attention deficit  hyperactivity disorder).    PSHX:  has a past surgical history that includes Tympanostomy tube placement.    The patient's social and family histories were reviewed by me and updated in the appropriate section of the electronic medical record.    Review of patient's allergies indicates:  No Known Allergies    Medications Ordered Prior to Encounter[1]    SOCIAL HISTORY:   Tobacco Use History[2]    Social History     Substance and Sexual Activity   Alcohol Use Never       Social History     Substance and Sexual Activity   Drug Use Never       ROS:   GENERAL: Denies fever, chills, weight loss/gain, fatigue  HEENT: Denies headaches, dizziness, vision/hearing changes  CARDIOVASCULAR: Denies chest pain, palpitations, or edema  RESPIRATORY: Denies dyspnea, cough  GI: Denies abdominal pain, rectal bleeding, nausea, vomiting. No change in bowel pattern or color  : Denies dysuria, hematuria   SKIN: Denies rash, itching   NEURO: Denies confusion, memory loss, or mood changes  PSYCH: Denies depression or anxiety  HEME/LYMPH: Denies easy bruising or bleeding    Objective Findings:    PHYSICAL EXAM:   Friendly White female, in no acute distress; alert and oriented to person, place and time  VITALS: There were no vitals taken for this visit.  HENT: Normocephalic, without obvious abnormality.   EYES: Sclerae anicteric.   NECK: No obvious masses.  CARDIOVASCULAR: RAGHAVENDRA.  RESPIRATORY: Normal respiratory effort.   GI: RAGHAVENDRA.  EXTREMITIES: RAGHAVENDRA.  SKIN: No jaundice. No rashes noted to exposed skin.   NEURO: No asterixis.  PSYCH:  Memory intact. Thought and speech pattern appropriate. Behavior normal. No depression or anxiety noted.    DIAGNOSTIC STUDIES: Reviewed in Epic.     FIBROSCAN - Ordered at visit.    EDUCATION:  Per AVS.    ASSESSMENT & PLAN:  19 y.o. White female with:    1. Hepatic steatosis  Ambulatory referral/consult to Hepatology    FibroScan Transplant Hepatology(Vibration Controlled Transient Elastography)    Hepatic  Function Panel    Hepatitis A antibody, IgG    Hepatitis B Core Antibody, Total    Hepatitis B Surface Antibody, Qual/Quant      2. Hepatomegaly  FibroScan Transplant Hepatology(Vibration Controlled Transient Elastography)    Hepatic Function Panel    Hepatitis A antibody, IgG    Hepatitis B Core Antibody, Total    Hepatitis B Surface Antibody, Qual/Quant      3. Elevated liver enzymes  FibroScan Transplant Hepatology(Vibration Controlled Transient Elastography)    Hepatic Function Panel    Hepatitis A antibody, IgG    Hepatitis B Core Antibody, Total    Hepatitis B Surface Antibody, Qual/Quant      4. Morbid obesity  FibroScan Transplant Hepatology(Vibration Controlled Transient Elastography)    Hepatic Function Panel    Hepatitis A antibody, IgG    Hepatitis B Core Antibody, Total    Hepatitis B Surface Antibody, Qual/Quant      5. Hypertriglyceridemia  FibroScan Transplant Hepatology(Vibration Controlled Transient Elastography)    Hepatic Function Panel    Hepatitis A antibody, IgG    Hepatitis B Core Antibody, Total    Hepatitis B Surface Antibody, Qual/Quant      6. Prediabetes  FibroScan Transplant Hepatology(Vibration Controlled Transient Elastography)    Hepatic Function Panel    Hepatitis A antibody, IgG    Hepatitis B Core Antibody, Total    Hepatitis B Surface Antibody, Qual/Quant      7. Polycystic ovarian syndrome  FibroScan Transplant Hepatology(Vibration Controlled Transient Elastography)    Hepatic Function Panel    Hepatitis A antibody, IgG    Hepatitis B Core Antibody, Total    Hepatitis B Surface Antibody, Qual/Quant         - Schedule Fibroscan to non-invasively stage liver disease.  - Repeat liver function tests in 3 months.  - Recommend referral to nutritionist to discuss dietary changes (patient declined).   - Continue Tirzepatide, as prescribed by PCP.   - Recommend initial weight loss goal of 25-30 lbs, through diet and exercise.  - Recommend good control of cholesterol, blood pressure, &  blood sugar levels.  - Avoid herbal supplements/alternative remedies.  - Limit alcohol use to no more than 5-7 standard drinks per week, ideally less.    Follow up in about 3 months (around 6/13/2025). with labs and Fibroscan before visit.    Thank you for allowing me to participate in the care of Lina Xiong       Hepatology Nurse Practitioner  [facility]    CC'ed note to:   Eliana Price MD Bango, Stephanie M., MD           [1]   Current Outpatient Medications on File Prior to Visit   Medication Sig Dispense Refill    dexmethylphenidate (FOCALIN XR) 30 mg 24 hr capsule Take 1 capsule by mouth every morning. 30 capsule 0    drospirenone-ethinyl estradioL (MIRTHA) 3-0.02 mg per tablet Take 1 tablet by mouth once daily. 84 tablet 3    fluocinonide 0.05% (LIDEX) 0.05 % cream Apply topically 2 (two) times daily. Use to affected areas for up to 2 weeks then take a 1 week break or decrease to 3 times weekly. Do not apply to groin or face. Steroid. Use to red itchy patches when flaring 60 g 2    levocetirizine (XYZAL) 5 MG tablet Take 5 mg by mouth.      metFORMIN (GLUCOPHAGE) 500 MG tablet TAKE 1 TABLET(500 MG) BY MOUTH DAILY WITH BREAKFAST 90 tablet 1    montelukast (SINGULAIR) 10 mg tablet Take 10 mg by mouth.      spironolactone (ALDACTONE) 100 MG tablet Take 1 tablet (100 mg total) by mouth once daily. 90 tablet 1    sumatriptan (IMITREX) 25 MG Tab Take 2 tablets (50 mg total) by mouth every 2 (two) hours as needed (for sever headache). 9 tablet 2    tirzepatide 2.5 mg/0.5 mL PnIj Inject 2.5 mg into the skin every 7 days. 2 mL 0    topiramate (TOPAMAX) 25 MG tablet Take 2 tablets (50 mg total) by mouth every evening. 60 tablet 11     No current facility-administered medications on file prior to visit.   [2]   Social History  Tobacco Use   Smoking Status Never   Smokeless Tobacco Never

## 2025-03-13 NOTE — TELEPHONE ENCOUNTER
Pt requesting to switch Tirzepatide rx to [facility], pended. Awaiting pt to respond with which [facility] location.

## 2025-03-13 NOTE — CONSULTS
[facility] - Hematology [facility]  Response for E-Consult     Patient Name: Lina Xiong  MRN: [id number]  Primary Care Provider: lEiana Price MD   Requesting Provider: Eliana Price MD  Consults    Recommendation: Thank you for the e-consult. Labs reviewed. Highest suspicion is the leukocytosis with granulocyte predominance and thrombocytosis is reactive. Would check a few more lab to be confident is this assessment.     First check iron panel and ferritin. Iron deficiency,  is the most common cause of thrombocytosis, even without anemia. It can also cause a mild leukocytosis.     Second, since 2 cell lines are affected would check BCRABL ([id number]) and MPN panel ([id number]) to essentially rule out a marrow process.    Additional future steps to consider: refer to hematology if BCRABL or MPN panel returns positive.    Total time of Consultation: 5 minute    I did not speak to the requesting provider verbally about this.     *This eConsult is based on the clinical data available to me and is furnished without benefit of a physical examination. The eConsult will need to be interpreted in light of any clinical issues or changes in patient status not available to me at the time of filing this eConsults. Significant changes in patient condition or level of acuity should result in immediate formal consultation and reevaluation. Please alert me if you have further questions.    Thank you for this eConsult referral.     Nighat iTllman MD  [facility] - Hematology [facility]

## 2025-03-28 ENCOUNTER — PATIENT MESSAGE (OUTPATIENT)
Dept: INTERNAL MEDICINE | Facility: CLINIC | Age: 20
End: 2025-03-28
Payer: COMMERCIAL

## 2025-03-28 DIAGNOSIS — K76.0 HEPATIC STEATOSIS: ICD-10-CM

## 2025-03-28 DIAGNOSIS — E66.813 CLASS 3 SEVERE OBESITY WITH SERIOUS COMORBIDITY AND BODY MASS INDEX (BMI) OF 40.0 TO 44.9 IN ADULT, UNSPECIFIED OBESITY TYPE: ICD-10-CM

## 2025-03-28 DIAGNOSIS — E66.01 CLASS 3 SEVERE OBESITY WITH SERIOUS COMORBIDITY AND BODY MASS INDEX (BMI) OF 40.0 TO 44.9 IN ADULT, UNSPECIFIED OBESITY TYPE: ICD-10-CM

## 2025-03-28 DIAGNOSIS — E28.2 POLYCYSTIC OVARIAN SYNDROME: ICD-10-CM

## 2025-03-30 NOTE — TELEPHONE ENCOUNTER
Refill Routing Note   Medication(s) are not appropriate for processing by [facility] for the following reason(s):        New or recently adjusted medication    [facility] action(s):  Defer     Requires labs : Yes             Appointments  past 12m or future 3m with PCP    Date Provider   Last Visit   3/11/2025 Eliana Price MD   Next Visit   Visit date not found Eliana Price MD   ED visits in past 90 days: 0        Note composed:11:23 AM 03/30/2025

## 2025-03-30 NOTE — TELEPHONE ENCOUNTER
Care Due:                  Date            Visit Type   Department     Provider  --------------------------------------------------------------------------------                                EP -                              PRIMARY      [facility] INTERNAL  Last Visit: 03-      CARE ([facility])   MEDICINE       Eliana Price  Next Visit: None Scheduled  None         None Found                                                            Last  Test          Frequency    Reason                     Performed    Due Date  --------------------------------------------------------------------------------    HBA1C.......  6 months...  tirzepatide..............  11- 05-    [facility] Embedded Care Due Messages. Reference number: [id number].   3/30/2025 8:22:25 AM CDT

## 2025-05-30 ENCOUNTER — PATIENT MESSAGE (OUTPATIENT)
Dept: INTERNAL MEDICINE | Facility: CLINIC | Age: 20
End: 2025-05-30
Payer: COMMERCIAL

## 2025-05-30 DIAGNOSIS — K76.0 HEPATIC STEATOSIS: ICD-10-CM

## 2025-05-30 DIAGNOSIS — E66.813 CLASS 3 SEVERE OBESITY WITH SERIOUS COMORBIDITY AND BODY MASS INDEX (BMI) OF 40.0 TO 44.9 IN ADULT, UNSPECIFIED OBESITY TYPE: ICD-10-CM

## 2025-05-30 DIAGNOSIS — E66.01 CLASS 3 SEVERE OBESITY WITH SERIOUS COMORBIDITY AND BODY MASS INDEX (BMI) OF 40.0 TO 44.9 IN ADULT, UNSPECIFIED OBESITY TYPE: ICD-10-CM

## 2025-05-30 DIAGNOSIS — E28.2 POLYCYSTIC OVARIAN SYNDROME: ICD-10-CM

## 2025-06-09 ENCOUNTER — RESULTS FOLLOW-UP (OUTPATIENT)
Dept: HEPATOLOGY | Facility: CLINIC | Age: 20
End: 2025-06-09

## 2025-06-09 ENCOUNTER — LAB VISIT (OUTPATIENT)
Dept: LAB | Facility: HOSPITAL | Age: 20
End: 2025-06-09
Payer: COMMERCIAL

## 2025-06-09 ENCOUNTER — PROCEDURE VISIT (OUTPATIENT)
Dept: HEPATOLOGY | Facility: CLINIC | Age: 20
End: 2025-06-09
Payer: COMMERCIAL

## 2025-06-09 DIAGNOSIS — R16.0 HEPATOMEGALY: ICD-10-CM

## 2025-06-09 DIAGNOSIS — E66.01 MORBID OBESITY: ICD-10-CM

## 2025-06-09 DIAGNOSIS — E28.2 POLYCYSTIC OVARIAN SYNDROME: ICD-10-CM

## 2025-06-09 DIAGNOSIS — R73.03 PREDIABETES: ICD-10-CM

## 2025-06-09 DIAGNOSIS — R74.8 ELEVATED LIVER ENZYMES: ICD-10-CM

## 2025-06-09 DIAGNOSIS — E78.1 HYPERTRIGLYCERIDEMIA: ICD-10-CM

## 2025-06-09 DIAGNOSIS — K76.0 HEPATIC STEATOSIS: ICD-10-CM

## 2025-06-09 LAB
ALBUMIN SERPL BCP-MCNC: 3.7 G/DL (ref 3.5–5.2)
ALP SERPL-CCNC: 83 UNIT/L (ref 40–150)
ALT SERPL W/O P-5'-P-CCNC: 57 UNIT/L (ref 10–44)
AST SERPL-CCNC: 28 UNIT/L (ref 11–45)
BILIRUB DIRECT SERPL-MCNC: 0.1 MG/DL (ref 0.1–0.3)
BILIRUB SERPL-MCNC: 0.2 MG/DL (ref 0.1–1)
HAV AB SER QL IA: REACTIVE
HBV CORE AB SERPL QL IA: NORMAL
PROT SERPL-MCNC: 7.7 GM/DL (ref 6–8.4)

## 2025-06-09 PROCEDURE — 86706 HEP B SURFACE ANTIBODY: CPT

## 2025-06-09 PROCEDURE — 91200 LIVER ELASTOGRAPHY: CPT | Mod: S$GLB,,, | Performed by: NURSE PRACTITIONER

## 2025-06-09 PROCEDURE — 82040 ASSAY OF SERUM ALBUMIN: CPT

## 2025-06-09 PROCEDURE — 86704 HEP B CORE ANTIBODY TOTAL: CPT

## 2025-06-09 PROCEDURE — 36415 COLL VENOUS BLD VENIPUNCTURE: CPT

## 2025-06-09 PROCEDURE — 86790 VIRUS ANTIBODY NOS: CPT

## 2025-06-09 NOTE — PROCEDURES
FibroScan Transplant Hepatology(Vibration Controlled Transient Elastography)    Date/Time: 6/9/2025 8:00 AM    Performed by: Deja Henry NP  Authorized by: Deja Henry NP    Diagnosis:  NAFLD    Probe:  XL    Universal Protocol: Patient's identity, procedure and site were verified, confirmatory pause was performed.  Discussed procedure including risks and potential complications.  Questions answered.  Patient verbalizes understanding and wishes to proceed with VCTE.     Procedure: After providing explanations of the procedure, patient was placed in the supine position with right arm in maximum abduction to allow optimal exposure of right lateral abdomen.  Patient was briefly assessed, Testing was performed in the mid-axillary location, 50Hz Shear Wave pulses were applied and the resulting Shear Wave and Propagation Speed detected with a 3.5 MHz ultrasonic signal, using the FibroScan probe, Skin to liver capsule distance and liver parenchyma were accessed during the entire examination with the FibroScan probe, Patient was instructed to breathe normally and to abstain from sudden movements during the procedure, allowing for random measurements of liver stiffness. At least 10 Shear Waves were produced, Individual measurements of each Shear Wave were calculated.  Patient tolerated the procedure well with no complications.  Meets discharge criteria as was dismissed.  Rates pain 0 out of 10.  Patient will follow up with ordering provider to review results.    Findings  Median liver stiffness score:  10.1  CAP Reading: dB/m:  357    IQR/med %:  5  Interpretation  Fibrosis interpretation is based on medial liver stiffness - Kilopascal (kPa).    Fibrosis Stage:  F2  Steatosis interpretation is based on controlled attenuation parameter - (dB/m).    Steatosis Grade:  S3

## 2025-06-11 LAB
W HEPATITIS B SURFACE ANTIBODY, QUALITATIVE: POSITIVE
W HEPATITIS B SURFACE ANTIBODY, QUANTITATIVE: 11 MIU/ML

## 2025-06-17 ENCOUNTER — PATIENT MESSAGE (OUTPATIENT)
Dept: INTERNAL MEDICINE | Facility: CLINIC | Age: 20
End: 2025-06-17
Payer: COMMERCIAL

## 2025-06-17 ENCOUNTER — OFFICE VISIT (OUTPATIENT)
Dept: HEPATOLOGY | Facility: CLINIC | Age: 20
End: 2025-06-17
Payer: COMMERCIAL

## 2025-06-17 DIAGNOSIS — E66.01 MORBID OBESITY: ICD-10-CM

## 2025-06-17 DIAGNOSIS — R16.0 HEPATOMEGALY: ICD-10-CM

## 2025-06-17 DIAGNOSIS — K76.0 METABOLIC DYSFUNCTION-ASSOCIATED STEATOTIC LIVER DISEASE (MASLD): Primary | ICD-10-CM

## 2025-06-17 DIAGNOSIS — E78.1 HYPERTRIGLYCERIDEMIA: ICD-10-CM

## 2025-06-17 DIAGNOSIS — R74.8 ELEVATED LIVER ENZYMES: ICD-10-CM

## 2025-06-17 DIAGNOSIS — R73.03 PREDIABETES: ICD-10-CM

## 2025-06-17 DIAGNOSIS — E28.2 POLYCYSTIC OVARIAN SYNDROME: ICD-10-CM

## 2025-06-17 PROCEDURE — 1159F MED LIST DOCD IN RCRD: CPT | Mod: CPTII,95,, | Performed by: NURSE PRACTITIONER

## 2025-06-17 PROCEDURE — 1160F RVW MEDS BY RX/DR IN RCRD: CPT | Mod: CPTII,95,, | Performed by: NURSE PRACTITIONER

## 2025-06-17 PROCEDURE — 98005 SYNCH AUDIO-VIDEO EST LOW 20: CPT | Mod: 95,,, | Performed by: NURSE PRACTITIONER

## 2025-06-17 NOTE — PROGRESS NOTES
The patient location is: [place]  The chief complaint leading to consultation is: Fatty Liver     Visit type: audiovisual    30 minutes of total time spent on the encounter, which includes face to face time and non-face to face time preparing to see the patient (eg, review of tests), Obtaining and/or reviewing separately obtained history, Documenting clinical information in the electronic or other health record, Independently interpreting results (not separately reported) and communicating results to the patient/family/caregiver, or Care coordination (not separately reported).     Each patient to whom he or she provides medical services by telemedicine is:  (1) informed of the relationship between the physician and patient and the respective role of any other health care provider with respect to management of the patient; and (2) notified that he or she may decline to receive medical services by telemedicine and may withdraw from such care at any time.    Notes:      [facility] Established Patient Visit    Reason for Visit:  Fatty Liver    PCP: Eliana Price    HPI:  This is a 19 y.o. female with PMH noted below, here for follow up for fatty liver. She was last seen in clinic by myself for a virtual visit in 3/2025.    The patient's risk factors for fatty liver disease include:     Overweight/Obesity                     Yes; BMI 42.4 - On Tirzepatide today per PCP.  Dyslipidemia                                Yes; Refer to most recent lipid panel results below:   Latest Reference Range & Units 12/17/24 10:22   Cholesterol Total 120 - 199 mg/dL 168   HDL 40 - 75 mg/dL 45   HDL/Cholesterol Ratio 20.0 - 50.0 % 26.8   Non-HDL Cholesterol mg/dL 123   Total Cholesterol/HDL Ratio 2.0 - 5.0  3.7   Triglycerides 30 - 150 mg/dL 291 (H)   LDL Cholesterol 63.0 - 159.0 mg/dL 64.8     Insulin resistance/Diabetes         Yes; Last HgbA1c was 5.7% (11/2024)    She has had intermittently elevated liver enzymes  in a hepatocellular pattern since at least 4/2008.    Abdominal US in 12/2024 showed:    FINDINGS:    Pancreas: Obscured by overlying bowel gas.     Aorta: No aneurysm.     The liver is enlarged in size measuring 23.1 cm.  It demonstrates increased echogenicity throughout and attenuates the sound consistent with fatty liver infiltration.     Gallbladder: No calculi, wall thickening, or pericholecystic fluid.  Negative sonographic Diaz's sign.     Biliary system: 5.8 mm common bile duct.  No intrahepatic ductal dilatation.     Inferior vena cava: Normal in appearance.     Right kidney: 10.3 cm. No hydronephrosis.     Left kidney: 10.7 cm. No hydronephrosis.     Spleen: 11.1 x 12.9 cm.  Normal in size with homogeneous echotexture.     Miscellaneous: No ascites.     Impression:     Hepatomegaly with hepatic steatosis.    She has never undergone a liver biopsy.    FIB-4 Calculation: 0.23 at 3/10/2025 10:29 AM  Calculated from:  SGOT/AST: 65 U/L at 3/10/2025 10:29 AM  SGPT/ALT: 92 U/L at 3/10/2025 10:29 AM  Platelets: 562 K/uL at 3/10/2025 10:29 AM  Age: 19 years     FIB-4 below 1.30 is considered as low-risk for advanced fibrosis  FIB-4 over 2.67 is considered as high-risk for advanced fibrosis  FIB-4 values between 1.30 and 2.67 are considered as intermediate-risk of advanced fibrosis for ages 36-64.     For ages > 64 the cut-off for low-risk goes to < 2.  This is a screening tool and clinical judgement should be used in the interpretation of these results.    She is adopted, so is unsure of her family medical history. She denies any history of heavy alcohol intake, and does not use illicit drugs. HAV, HBV, HCV negative on prior labs. She is immune to Hepatitis A and B. Reassuringly, liver enzymes are improving with weight loss. She has not weighed recently, but feels that her clothes are fitting differently.     Fibroscan to non-invasively stage her liver disease this month was suggestive of severe fatty  infiltration of the liver (S3), with F2 (moderate) fibrosis, and a low likelihood of cirrhosis. However, we discussed that the Fibroscan can over-estimate fibrosis score in the setting of severe fatty infiltration of the liver and inflammation in the liver. We discussed obtaining a liver biopsy now for definitive fibrosis staging, versus repeating Fibroscan in 6 months. She elected to repeat Fibroscan in 6 months.      She is well appearing, and has no signs or symptoms of hepatic decompensation including jaundice, dark urine, pruritus, abdominal distention, lower extremity edema, hematemesis, melena, or periods of confusion suggestive of hepatic encephalopathy.      PMHX:  has a past medical history of ADHD (attention deficit hyperactivity disorder).    PSHX:  has a past surgical history that includes Tympanostomy tube placement.    The patient's social and family histories were reviewed by me and updated in the appropriate section of the electronic medical record.    Review of patient's allergies indicates:  No Known Allergies    Medications Ordered Prior to Encounter[1]    SOCIAL HISTORY:   Tobacco Use History[2]    Social History     Substance and Sexual Activity   Alcohol Use Never     Social History     Substance and Sexual Activity   Drug Use Never     ROS:   GENERAL: Denies fever, chills, weight loss/gain, fatigue  HEENT: Denies headaches, dizziness, vision/hearing changes  CARDIOVASCULAR: Denies chest pain, palpitations, or edema  RESPIRATORY: Denies dyspnea, cough  GI: Denies abdominal pain, rectal bleeding, nausea, vomiting. + Intermittent diarrhea  : Denies dysuria, hematuria   SKIN: Denies rash, itching   NEURO: Denies confusion, memory loss, or mood changes  PSYCH: Denies depression or anxiety  HEME/LYMPH: Denies easy bruising or bleeding    Objective Findings:    PHYSICAL EXAM:   Friendly White female, in no acute distress; alert and oriented to person, place and time.  VITALS: There were no vitals  taken for this visit.  HENT: Normocephalic, without obvious abnormality.   EYES: Sclerae anicteric.   NECK: No obvious masses.  CARDIOVASCULAR: RAGHAVENDRA.  RESPIRATORY: Normal respiratory effort.   GI: RAGHAVENDRA.  EXTREMITIES: RAGHAVENDRA.  SKIN: No jaundice. No rashes noted to exposed skin.   NEURO: No asterixis.  PSYCH:  Memory intact. Thought and speech pattern appropriate. Behavior normal. No depression or anxiety noted.    DIAGNOSTIC STUDIES: Reviewed in Epic.     FIBROSCAN 6/9/2025:    Findings  Median liver stiffness score:  10.1  CAP Reading: dB/m:  357     IQR/med %:  5  Interpretation  Fibrosis interpretation is based on medial liver stiffness - Kilopascal (kPa).     Fibrosis Stage:  F2  Steatosis interpretation is based on controlled attenuation parameter - (dB/m).     Steatosis Grade:  S3    EDUCATION:  Per AVS.    ASSESSMENT & PLAN:  19 y.o. White female with:    1. Metabolic dysfunction-associated steatotic liver disease (MASLD)  FibroScan Transplant Hepatology(Vibration Controlled Transient Elastography)    Hepatic Function Panel      2. Elevated liver enzymes  FibroScan Transplant Hepatology(Vibration Controlled Transient Elastography)    Hepatic Function Panel      3. Hepatomegaly  FibroScan Transplant Hepatology(Vibration Controlled Transient Elastography)    Hepatic Function Panel      4. Morbid obesity  FibroScan Transplant Hepatology(Vibration Controlled Transient Elastography)    Hepatic Function Panel      5. Hypertriglyceridemia  FibroScan Transplant Hepatology(Vibration Controlled Transient Elastography)    Hepatic Function Panel      6. Prediabetes  FibroScan Transplant Hepatology(Vibration Controlled Transient Elastography)    Hepatic Function Panel      7. Polycystic ovarian syndrome  FibroScan Transplant Hepatology(Vibration Controlled Transient Elastography)    Hepatic Function Panel        - Schedule Fibroscan to non-invasively re-stage liver disease in 6 months.  - Repeat liver function tests in 6  months.  - Continue Tirzepatide, as prescribed by PCP. Discuss dose increase with PCP at next visit.   - Recommend weight loss goal of 25-30 lbs, through diet and exercise.  - Recommend good control of cholesterol, blood pressure, & blood sugar levels.  - Avoid herbal supplements/alternative remedies.  - Limit alcohol use to no more than 5-7 standard drinks per week, ideally less.    Follow up in about 6 months (around 12/17/2025). with labs and Fibroscan before visit.    Thank you for allowing me to participate in the care of Lina Corcoran Inga       Hepatology Nurse Practitioner  [facility]    CC'ed note to:   No ref. provider found  Eliana Price MD           [1]   Current Outpatient Medications on File Prior to Visit   Medication Sig Dispense Refill    dexmethylphenidate (FOCALIN XR) 30 mg 24 hr capsule Take 1 capsule by mouth every morning. 30 capsule 0    drospirenone-ethinyl estradioL (MIRTHA) 3-0.02 mg per tablet Take 1 tablet by mouth once daily. 84 tablet 3    fluocinonide 0.05% (LIDEX) 0.05 % cream Apply topically 2 (two) times daily. Use to affected areas for up to 2 weeks then take a 1 week break or decrease to 3 times weekly. Do not apply to groin or face. Steroid. Use to red itchy patches when flaring 60 g 2    levocetirizine (XYZAL) 5 MG tablet Take 5 mg by mouth.      metFORMIN (GLUCOPHAGE) 500 MG tablet TAKE 1 TABLET(500 MG) BY MOUTH DAILY WITH BREAKFAST 90 tablet 1    montelukast (SINGULAIR) 10 mg tablet Take 10 mg by mouth.      spironolactone (ALDACTONE) 100 MG tablet Take 1 tablet (100 mg total) by mouth once daily. 90 tablet 1    sumatriptan (IMITREX) 25 MG Tab Take 2 tablets (50 mg total) by mouth every 2 (two) hours as needed (for sever headache). 9 tablet 2    tirzepatide 5 mg/0.5 mL PnIj Inject 5 mg into the skin every 7 days. 2 mL 2    topiramate (TOPAMAX) 25 MG tablet Take 2 tablets (50 mg total) by mouth every evening. 60 tablet 11      No current facility-administered medications on file prior to visit.   [2]   Social History  Tobacco Use   Smoking Status Never   Smokeless Tobacco Never

## 2025-07-11 ENCOUNTER — LAB VISIT (OUTPATIENT)
Dept: LAB | Facility: HOSPITAL | Age: 20
End: 2025-07-11
Payer: COMMERCIAL

## 2025-07-11 ENCOUNTER — OFFICE VISIT (OUTPATIENT)
Dept: INTERNAL MEDICINE | Facility: CLINIC | Age: 20
End: 2025-07-11
Payer: COMMERCIAL

## 2025-07-11 VITALS
WEIGHT: 241.88 LBS | OXYGEN SATURATION: 100 % | BODY MASS INDEX: 38.87 KG/M2 | DIASTOLIC BLOOD PRESSURE: 78 MMHG | SYSTOLIC BLOOD PRESSURE: 138 MMHG | HEIGHT: 66 IN | HEART RATE: 88 BPM

## 2025-07-11 DIAGNOSIS — E66.812 CLASS 2 OBESITY WITH BODY MASS INDEX (BMI) OF 39.0 TO 39.9 IN ADULT, UNSPECIFIED OBESITY TYPE, UNSPECIFIED WHETHER SERIOUS COMORBIDITY PRESENT: Primary | ICD-10-CM

## 2025-07-11 DIAGNOSIS — K76.0 HEPATIC STEATOSIS: ICD-10-CM

## 2025-07-11 DIAGNOSIS — D75.839 THROMBOCYTOSIS: ICD-10-CM

## 2025-07-11 DIAGNOSIS — F90.9 ATTENTION DEFICIT HYPERACTIVITY DISORDER (ADHD), UNSPECIFIED ADHD TYPE: ICD-10-CM

## 2025-07-11 LAB
ABSOLUTE EOSINOPHIL (OHS): 0.29 K/UL
ABSOLUTE MONOCYTE (OHS): 0.72 K/UL (ref 0.3–1)
ABSOLUTE NEUTROPHIL COUNT (OHS): 9.34 K/UL (ref 1.8–7.7)
ALBUMIN SERPL BCP-MCNC: 3.9 G/DL (ref 3.5–5.2)
ALP SERPL-CCNC: 88 UNIT/L (ref 40–150)
ALT SERPL W/O P-5'-P-CCNC: 53 UNIT/L (ref 10–44)
ANION GAP (OHS): 9 MMOL/L (ref 8–16)
AST SERPL-CCNC: 27 UNIT/L (ref 11–45)
BASOPHILS # BLD AUTO: 0.08 K/UL
BASOPHILS NFR BLD AUTO: 0.5 %
BILIRUB SERPL-MCNC: 0.3 MG/DL (ref 0.1–1)
BUN SERPL-MCNC: 14 MG/DL (ref 6–20)
CALCIUM SERPL-MCNC: 9.4 MG/DL (ref 8.7–10.5)
CHLORIDE SERPL-SCNC: 108 MMOL/L (ref 95–110)
CO2 SERPL-SCNC: 23 MMOL/L (ref 23–29)
CREAT SERPL-MCNC: 1 MG/DL (ref 0.5–1.4)
ERYTHROCYTE [DISTWIDTH] IN BLOOD BY AUTOMATED COUNT: 13.3 % (ref 11.5–14.5)
GFR SERPLBLD CREATININE-BSD FMLA CKD-EPI: >60 ML/MIN/1.73/M2
GLUCOSE SERPL-MCNC: 86 MG/DL (ref 70–110)
HCT VFR BLD AUTO: 43 % (ref 37–48.5)
HGB BLD-MCNC: 13.8 GM/DL (ref 12–16)
IMM GRANULOCYTES # BLD AUTO: 0.13 K/UL (ref 0–0.04)
IMM GRANULOCYTES NFR BLD AUTO: 0.9 % (ref 0–0.5)
LYMPHOCYTES # BLD AUTO: 4.01 K/UL (ref 1–4.8)
MCH RBC QN AUTO: 28 PG (ref 27–31)
MCHC RBC AUTO-ENTMCNC: 32.1 G/DL (ref 32–36)
MCV RBC AUTO: 87 FL (ref 82–98)
NUCLEATED RBC (/100WBC) (OHS): 0 /100 WBC
PLATELET # BLD AUTO: 531 K/UL (ref 150–450)
PMV BLD AUTO: 9.5 FL (ref 9.2–12.9)
POTASSIUM SERPL-SCNC: 4.2 MMOL/L (ref 3.5–5.1)
PROT SERPL-MCNC: 8.1 GM/DL (ref 6–8.4)
RBC # BLD AUTO: 4.93 M/UL (ref 4–5.4)
RELATIVE EOSINOPHIL (OHS): 2 %
RELATIVE LYMPHOCYTE (OHS): 27.5 % (ref 18–48)
RELATIVE MONOCYTE (OHS): 4.9 % (ref 4–15)
RELATIVE NEUTROPHIL (OHS): 64.2 % (ref 38–73)
SODIUM SERPL-SCNC: 140 MMOL/L (ref 136–145)
WBC # BLD AUTO: 14.57 K/UL (ref 3.9–12.7)

## 2025-07-11 PROCEDURE — 85025 COMPLETE CBC W/AUTO DIFF WBC: CPT

## 2025-07-11 PROCEDURE — 99999 PR PBB SHADOW E&M-EST. PATIENT-LVL IV: CPT | Mod: PBBFAC,,, | Performed by: INTERNAL MEDICINE

## 2025-07-11 PROCEDURE — 36415 COLL VENOUS BLD VENIPUNCTURE: CPT

## 2025-07-11 PROCEDURE — 82040 ASSAY OF SERUM ALBUMIN: CPT

## 2025-07-11 RX ORDER — DEXMETHYLPHENIDATE HYDROCHLORIDE 30 MG/1
1 CAPSULE, EXTENDED RELEASE ORAL EVERY MORNING
Qty: 30 CAPSULE | Refills: 0 | Status: SHIPPED | OUTPATIENT
Start: 2025-07-11

## 2025-07-11 NOTE — PROGRESS NOTES
"Subjective:       Patient ID: Lina Xiong is a 19 y.o. female.    Chief Complaint: Medication Refill      Presents for follow up.     Weight management: We started her on Mounjaro in March 2025. She has lost 20 IBS since. Tolerating medication well. She is also eating healthy diet and exercising daily.     Hepatic steatosis: dx. In March 2025. Following with hepatology. Last LFTs improved since starting Mounjaro. Liver US showed hepatomegaly with hepatic steatosis.     WBC and platelets elevated on previous labs. She is asymptomatic. E consult placed. "Highest suspicion is the leukocytosis with granulocyte predominance and thrombocytosis is reactive. "       Migraines: now following with neurology. Currently on topamax and sumatriptan. Migraines have decreased in frequency and intensity     ADD on Focalin 30 mg XR. Doing well on this dose. Denies any chest pain, palpitations, insomnia or decreased appetite.      PCOS on spironolactone and metformin.      Medication Refill  Associated symptoms include headaches and neck pain. Pertinent negatives include no arthralgias, chest pain, joint swelling, vomiting or weakness.   Headache   Associated symptoms include neck pain. Pertinent negatives include no hearing loss, rhinorrhea, vomiting or weakness.     Review of Systems   Constitutional:  Positive for activity change. Negative for unexpected weight change.   HENT:  Negative for hearing loss, rhinorrhea and trouble swallowing.    Eyes:  Positive for visual disturbance. Negative for discharge.   Respiratory:  Negative for chest tightness and wheezing.    Cardiovascular:  Negative for chest pain and palpitations.   Gastrointestinal:  Negative for blood in stool, constipation, diarrhea and vomiting.   Endocrine: Negative for polydipsia and polyuria.   Genitourinary:  Negative for difficulty urinating, dysuria, hematuria and menstrual problem.   Musculoskeletal:  Positive for neck pain. Negative for arthralgias " and joint swelling.   Neurological:  Positive for headaches. Negative for weakness.   Psychiatric/Behavioral:  Negative for confusion and dysphoric mood.            Past Medical History:   Diagnosis Date    ADHD (attention deficit hyperactivity disorder)      Past Surgical History:   Procedure Laterality Date    TYMPANOSTOMY TUBE PLACEMENT        Patient Active Problem List   Diagnosis    Molluscum contagiosum    ADHD (attention deficit hyperactivity disorder)    Body mass index, pediatric, greater than or equal to 95th percentile for age    Acanthosis nigricans    Abnormal weight gain    Irregular periods    BMI (body mass index) pediatric, > 99% for age, obese child, tertiary care intervention    Hypertriglyceridemia    Hyperinsulinemia    Hirsutism    Polycystic ovarian syndrome    Metabolic dysfunction-associated steatotic liver disease (MASLD)    Hepatomegaly    Elevated liver enzymes    Morbid obesity    Prediabetes        Objective:      Physical Exam  Constitutional:       Appearance: Normal appearance.   HENT:      Head: Normocephalic.   Cardiovascular:      Rate and Rhythm: Normal rate and regular rhythm.      Pulses: Normal pulses.      Heart sounds: Normal heart sounds.   Pulmonary:      Effort: Pulmonary effort is normal.      Breath sounds: Normal breath sounds.   Abdominal:      General: Abdomen is flat. Bowel sounds are normal.      Palpations: Abdomen is soft.   Musculoskeletal:         General: Normal range of motion.      Cervical back: Normal range of motion and neck supple.   Skin:     General: Skin is warm and dry.   Neurological:      General: No focal deficit present.      Mental Status: She is alert and oriented to person, place, and time.   Psychiatric:         Mood and Affect: Mood normal.         Assessment:       Problem List Items Addressed This Visit    None        Plan:         Class 2 obesity with body mass index (BMI) of 39.0 to 39.9 in adult, unspecified obesity type, unspecified  whether serious comorbidity present  -     tirzepatide 7.5 mg/0.5 mL PnIj; Inject 7.5 mg into the skin every 7 days.  Dispense: 2 mL; Refill: 4  DOing well on GLP1. Has lost 20 IBS!   Will increase dose to 7.5 mg weekly.   Continue to work on diet and exercise.     Attention deficit hyperactivity disorder (ADHD), unspecified ADHD type  -     dexmethylphenidate (FOCALIN XR) 30 mg 24 hr capsule; Take 1 capsule by mouth every morning.  Dispense: 30 capsule; Refill: 0  Doing well on this dose. Denies any chest pain, palpitations, insomnia or decreased appetite.      Hepatic steatosis  -     CBC Auto Differential; Future; Expected date: 07/11/2025  -     Comprehensive Metabolic Panel; Future; Expected date: 07/11/2025  LFts have improved.   Repeat labs today.   20 IBS weight loss.     Thrombocytosis  -     Iron and TIBC; Future; Expected date: 07/11/2025  -     Ferritin; Future; Expected date: 07/11/2025  Repeat labs today and check iron and ferritin.   If remain elevated will refer to hematology.              Eliana Price MD   Internal Medicine   Primary Care

## 2025-07-25 ENCOUNTER — TELEPHONE (OUTPATIENT)
Dept: INTERNAL MEDICINE | Facility: CLINIC | Age: 20
End: 2025-07-25
Payer: COMMERCIAL

## 2025-07-29 DIAGNOSIS — E28.2 PCOS (POLYCYSTIC OVARIAN SYNDROME): ICD-10-CM

## 2025-07-29 RX ORDER — METFORMIN HYDROCHLORIDE 500 MG/1
500 TABLET ORAL
Qty: 90 TABLET | Refills: 0 | Status: SHIPPED | OUTPATIENT
Start: 2025-07-29

## 2025-07-29 NOTE — TELEPHONE ENCOUNTER
Refill Routing Note   Medication(s) are not appropriate for processing by [facility] for the following reason(s):        Required labs outdated    [facility] action(s):  Defer               Appointments  past 12m or future 3m with PCP    Date Provider   Last Visit   8/12/2024 Eliana Anderson MD   Next Visit   Visit date not found Eliana Anderson MD   ED visits in past 90 days: 0        Note composed:1:06 PM 07/29/2025

## 2025-08-04 ENCOUNTER — PATIENT MESSAGE (OUTPATIENT)
Dept: INTERNAL MEDICINE | Facility: CLINIC | Age: 20
End: 2025-08-04
Payer: COMMERCIAL

## 2025-08-04 DIAGNOSIS — F90.9 ATTENTION DEFICIT HYPERACTIVITY DISORDER (ADHD), UNSPECIFIED ADHD TYPE: ICD-10-CM

## 2025-08-04 NOTE — TELEPHONE ENCOUNTER
Care Due:                  Date            Visit Type   Department     Provider  --------------------------------------------------------------------------------                                EP -                              PRIMARY      [facility] INTERNAL  Last Visit: 07-      CARE ([facility])   ANA LAURA Price                              [facility]                              PRIMARY      [facility] INTERNAL  Next Visit: 11-      CARE ([facility])   [facility]       Eliana Price                                                            Last  Test          Frequency    Reason                     Performed    Due Date  --------------------------------------------------------------------------------    HBA1C.......  6 months...  tirzepatide..............  11- 05-    [facility] Embedded Care Due Messages. Reference number: [id number].   8/04/2025 2:10:27 PM CDT

## 2025-08-06 ENCOUNTER — PATIENT MESSAGE (OUTPATIENT)
Dept: INTERNAL MEDICINE | Facility: CLINIC | Age: 20
End: 2025-08-06
Payer: COMMERCIAL

## 2025-08-06 NOTE — TELEPHONE ENCOUNTER
Upon review of chart, e-visit request appeared to be generated by pt's [facility] account per past appointment tab.

## 2025-08-07 RX ORDER — DEXMETHYLPHENIDATE HYDROCHLORIDE 30 MG/1
1 CAPSULE, EXTENDED RELEASE ORAL EVERY MORNING
Qty: 30 CAPSULE | Refills: 0 | Status: SHIPPED | OUTPATIENT
Start: 2025-08-07

## 2025-08-11 ENCOUNTER — LAB VISIT (OUTPATIENT)
Dept: LAB | Facility: HOSPITAL | Age: 20
End: 2025-08-11
Payer: COMMERCIAL

## 2025-08-11 DIAGNOSIS — D72.829 LEUKOCYTOSIS, UNSPECIFIED TYPE: ICD-10-CM

## 2025-08-11 DIAGNOSIS — D75.839 THROMBOCYTOSIS: ICD-10-CM

## 2025-08-11 LAB
ABSOLUTE EOSINOPHIL (OHS): 0.31 K/UL
ABSOLUTE MONOCYTE (OHS): 0.8 K/UL (ref 0.3–1)
ABSOLUTE NEUTROPHIL COUNT (OHS): 7.28 K/UL (ref 1.8–7.7)
BASOPHILS # BLD AUTO: 0.09 K/UL
BASOPHILS NFR BLD AUTO: 0.7 %
ERYTHROCYTE [DISTWIDTH] IN BLOOD BY AUTOMATED COUNT: 12.7 % (ref 11.5–14.5)
FERRITIN SERPL-MCNC: 169 NG/ML (ref 20–300)
HCT VFR BLD AUTO: 42.1 % (ref 37–48.5)
HGB BLD-MCNC: 13.7 GM/DL (ref 12–16)
IMM GRANULOCYTES # BLD AUTO: 0.07 K/UL (ref 0–0.04)
IMM GRANULOCYTES NFR BLD AUTO: 0.5 % (ref 0–0.5)
IRON SATN MFR SERPL: 22 % (ref 20–50)
IRON SERPL-MCNC: 85 UG/DL (ref 30–160)
LYMPHOCYTES # BLD AUTO: 5.01 K/UL (ref 1–4.8)
MCH RBC QN AUTO: 28.1 PG (ref 27–31)
MCHC RBC AUTO-ENTMCNC: 32.5 G/DL (ref 32–36)
MCV RBC AUTO: 86 FL (ref 82–98)
NUCLEATED RBC (/100WBC) (OHS): 0 /100 WBC
PLATELET # BLD AUTO: 524 K/UL (ref 150–450)
PMV BLD AUTO: 9.4 FL (ref 9.2–12.9)
RBC # BLD AUTO: 4.88 M/UL (ref 4–5.4)
RELATIVE EOSINOPHIL (OHS): 2.3 %
RELATIVE LYMPHOCYTE (OHS): 36.9 % (ref 18–48)
RELATIVE MONOCYTE (OHS): 5.9 % (ref 4–15)
RELATIVE NEUTROPHIL (OHS): 53.7 % (ref 38–73)
TIBC SERPL-MCNC: 380 UG/DL (ref 250–450)
TRANSFERRIN SERPL-MCNC: 257 MG/DL (ref 200–375)
WBC # BLD AUTO: 13.56 K/UL (ref 3.9–12.7)

## 2025-08-11 PROCEDURE — 85025 COMPLETE CBC W/AUTO DIFF WBC: CPT

## 2025-08-11 PROCEDURE — 82728 ASSAY OF FERRITIN: CPT

## 2025-08-11 PROCEDURE — 36415 COLL VENOUS BLD VENIPUNCTURE: CPT

## 2025-08-11 PROCEDURE — 83540 ASSAY OF IRON: CPT

## 2025-08-18 DIAGNOSIS — E28.2 PCOS (POLYCYSTIC OVARIAN SYNDROME): ICD-10-CM

## 2025-08-18 RX ORDER — SPIRONOLACTONE 100 MG/1
100 TABLET, FILM COATED ORAL DAILY
Qty: 90 TABLET | Refills: 0 | Status: SHIPPED | OUTPATIENT
Start: 2025-08-18